# Patient Record
Sex: FEMALE | Race: WHITE | NOT HISPANIC OR LATINO | URBAN - METROPOLITAN AREA
[De-identification: names, ages, dates, MRNs, and addresses within clinical notes are randomized per-mention and may not be internally consistent; named-entity substitution may affect disease eponyms.]

---

## 2023-11-08 DIAGNOSIS — N89.8 VAGINAL DRYNESS: Primary | ICD-10-CM

## 2023-11-08 RX ORDER — ESTRADIOL 0.1 MG/G
1 CREAM VAGINAL
COMMUNITY
End: 2023-11-08 | Stop reason: SDUPTHER

## 2023-11-08 RX ORDER — ESTRADIOL 0.1 MG/G
1 CREAM VAGINAL
Qty: 127.5 G | Refills: 2 | Status: SHIPPED | OUTPATIENT
Start: 2023-11-09

## 2023-11-08 NOTE — TELEPHONE ENCOUNTER
Patient called requesting refill for Estradiol vaginal cream.  Request for refill sent to Diana Gomez CNP.

## 2024-05-31 NOTE — PROGRESS NOTES
"Patient ID: Angie Morin is a 63 y.o. female.  Primary Care Provider: Brandon Good, DO    Subjective    56 yo who underwent hysterectomy and ultimately trachelectomy with bilateral salpingo-ophorectomy 2015 for what was thought to be a routine fibroid surgery and pathology diagnosed a high grade endometrial cancer.  Pathology review favors endometrioid histology.  Anatomy at surgery was challenging.  Uterus would not mobilize and myometrium split in the effort to mobilize.  Supracervical hysterectomy followed by trachelectomy was performed. 515 gram supracervical specimen mostly due to fibroid change.  There was an intracavitary adenocarcinoma deemed high grade endometrial carcinoma with >50 % myoinvasive with no LVSI.  Per op note, there was also tumor contamination of the operative field.  No obvious peritoneal studding seen.     Patient reports undergoing D&C for AUB (heavy, regular bleeding) in ; pathology c/w fibroids and patient subsequently underwent ablation.  She was doing well until 2 years ago when she noted worsening \"pressure\".  In July, she had an episode of bleeding with associated RLQ pain.  She underwent pelvic US which revealed enlarged, presumed fibroid uterus and was recommended ERICA/BSO which she underwent on  as above.     Referred by Dr. Juve Call.  Personal friend of Dr. Vicky Schneider.  Had chemotherapy locally by Dr. Schneider.   Last chemotherapy April 15.  Tolerated T/C x 6 x 6 cycles.    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[place], former teacher; lives with  Marvin; denies ETOH, tobacco, illicits  FH: mother  of liver cancer at 68; paternal " and maternal grandmothers breast cancer in late 70s      Objective    Visit Vitals  /77 (BP Location: Right arm, Patient Position: Sitting, BP Cuff Size: Adult)   Pulse 64   Temp 36.2 °C (97.2 °F) (Temporal)   Resp 16        Interval history:  Patient is a 63 year old female with a history of Stage IB grade 3 endometrial cancer s/p hysterectomy and ultimately trachelectomy with bilateral salpingo-oophorectomy on November 18, 2015 and s/p 6 cycles of Carbo/Taxol and EBRT after chemotherapy in [facility] by Dr. Vicky Schneider and Dr. Talley. Vaginal brachytherapy x 3 at [facility] (Dr. Maldonado). Completed June 27, 2016.  Had difficulty with stool incontinence and radiation vaginitis, did hyperbaric treatments and pelvic floor therapy and been using Estrace cream.  Patient states her stool incontinence has worsened, has previously seen GI and pelvic floor therapy.  Recently saw PCP and was started on Colesevelam about a week ago, she has already noticed an improvement in symptoms.  Still eating increased fiber and takes Imodium as needed.  Patient still using Estrace as prescribed, denies any dyspareunia, only spotting a few times after sex.  Denies any bladder issues.  Mammogram is up to date.       Physical Exam:    Constitutional: Doing well. MONICA  Eyes: PERRL  ENMT: Moist mucus membranes  Head/Neck: Supple. Symmetrical  Cardiovascular: Regular, rate and rhythm. 2+ equal pulses of the extremities  Respiratory/Thorax: CTA. RRR. Chest rise symmetrical.  Gastrointestinal: Non-distended, soft, non-tender  Genitourinary:   Normal external female genitalia. No vulvar lesions noted  Speculum exam: Smooth vaginal walls without lesions or masses. Vaginal cuff visualized without lesions. Shortened vaginal, agglutination on right, improved radiation changes from previous exam  Bimanual exam: Smooth vaginal wall without lesions or masses.  Surgically absent uterus, cervix, and adnexa.    Rectovaginal exam: smooth rectovaginal septum  without lesions or masses  Musculoskeletal: ROM intact, no joint swelling, normal strength  Breast:  No nipple discharge, no asymmetry, no masses noted  Extremities: No edema  Neurological: Alert and oriented x 3. Pleasant and cooperative.  Lymphatic: No lymphadenopathy. No lymphedema  Psychological: Appropriate mood and behavior  Skin: Warm and dry, no lesions, no rashes    A complete review of systems was performed and all systems were normal except what is noted in the interval history.          Assessment/Plan   Patient is a 63 year old female with a history of Stage IB grade 3 endometrial cancer s/p hysterectomy and ultimately trachelectomy with bilateral salpingo-oophorectomy on November 18, 2015 and s/p 6 cycles of Carbo/Taxol and EBRT after chemotherapy in [facility] by Dr. Vicky Schneider and Dr. Talley. Vaginal brachytherapy x 3 at [facility] (Dr. Maldonado). Completed June 27, 2016. MONICA 8 years.       PLAN:  Continue with estrace as prescribed  F/U in 1 year or as needed  Physical examination was within normal limits today.  She is currently MONICA.  We reviewed signs and symptoms of possible recurrence with the patient and she will call our office should she experience any of these.

## 2024-06-04 ENCOUNTER — OFFICE VISIT (OUTPATIENT)
Dept: GYNECOLOGIC ONCOLOGY | Facility: CLINIC | Age: 64
End: 2024-06-04
Payer: COMMERCIAL

## 2024-06-04 VITALS
DIASTOLIC BLOOD PRESSURE: 77 MMHG | SYSTOLIC BLOOD PRESSURE: 126 MMHG | BODY MASS INDEX: 28.31 KG/M2 | HEART RATE: 64 BPM | WEIGHT: 181.66 LBS | RESPIRATION RATE: 16 BRPM | OXYGEN SATURATION: 98 % | TEMPERATURE: 97.2 F

## 2024-06-04 DIAGNOSIS — C54.1 ENDOMETRIAL CANCER (MULTI): Primary | ICD-10-CM

## 2024-06-04 PROCEDURE — 99213 OFFICE O/P EST LOW 20 MIN: CPT | Performed by: NURSE PRACTITIONER

## 2024-06-04 RX ORDER — COLESEVELAM 180 1/1
TABLET ORAL
COMMUNITY
Start: 2024-05-29

## 2024-06-04 RX ORDER — OMEPRAZOLE 20 MG/1
20 CAPSULE, DELAYED RELEASE ORAL
COMMUNITY

## 2024-06-04 RX ORDER — LIDOCAINE 50 MG/G
PATCH TOPICAL
COMMUNITY
Start: 2024-01-26

## 2024-06-04 RX ORDER — TRIAMCINOLONE ACETONIDE 1 MG/ML
LOTION TOPICAL
COMMUNITY
Start: 2024-05-15

## 2024-06-04 RX ORDER — CLOTRIMAZOLE AND BETAMETHASONE DIPROPIONATE 10; .64 MG/G; MG/G
CREAM TOPICAL
COMMUNITY
Start: 2024-05-28

## 2024-06-04 RX ORDER — PNV NO.95/FERROUS FUM/FOLIC AC 28MG-0.8MG
100 TABLET ORAL DAILY
COMMUNITY

## 2024-06-04 RX ORDER — GABAPENTIN 100 MG/1
CAPSULE ORAL
COMMUNITY
Start: 2023-07-20

## 2024-06-04 RX ORDER — MELOXICAM 15 MG/1
15 TABLET ORAL DAILY
COMMUNITY
Start: 2024-04-29

## 2024-06-04 RX ORDER — LANOLIN ALCOHOL/MO/W.PET/CERES
1000 CREAM (GRAM) TOPICAL DAILY
COMMUNITY
Start: 2024-03-11

## 2024-06-04 RX ORDER — LYSINE HCL 500 MG
TABLET ORAL
COMMUNITY
Start: 2017-02-03

## 2024-06-04 ASSESSMENT — PAIN SCALES - GENERAL: PAINLEVEL: 0-NO PAIN

## 2024-12-06 DIAGNOSIS — N89.8 VAGINAL DRYNESS: ICD-10-CM

## 2024-12-06 RX ORDER — ESTRADIOL 0.1 MG/G
1 CREAM VAGINAL
Qty: 127.5 G | Refills: 2 | Status: SHIPPED | OUTPATIENT
Start: 2024-12-07

## 2024-12-06 NOTE — TELEPHONE ENCOUNTER
Patient called requesting refill of Estradiol cream.  Refill pended to Aurora Malhotra, CNP    [id number]  Phoned patient to notify that Estradiol refill was sent to [facility].

## 2025-06-03 ENCOUNTER — APPOINTMENT (OUTPATIENT)
Dept: GYNECOLOGIC ONCOLOGY | Facility: CLINIC | Age: 65
End: 2025-06-03
Payer: COMMERCIAL

## 2025-06-03 NOTE — PROGRESS NOTES
"Patient ID: Angie Morin is a 64 y.o. female.  Primary Care Provider: Brandon Good,     Subjective    Underwent hysterectomy and ultimately trachelectomy with bilateral salpingo-ophorectomy 2015 for what was thought to be a routine fibroid surgery and pathology diagnosed a high grade endometrial cancer.  Pathology review favors endometrioid histology.  Anatomy at surgery was challenging.  Uterus would not mobilize and myometrium split in the effort to mobilize.  Supracervical hysterectomy followed by trachelectomy was performed. 515 gram supracervical specimen mostly due to fibroid change.  There was an intracavitary adenocarcinoma deemed high grade endometrial carcinoma with >50 % myoinvasive with no LVSI.  Per op note, there was also tumor contamination of the operative field.  No obvious peritoneal studding seen.     Patient reports undergoing D&C for AUB (heavy, regular bleeding) in ; pathology c/w fibroids and patient subsequently underwent ablation.  She was doing well until 2 years ago when she noted worsening \"pressure\".  In July, she had an episode of bleeding with associated RLQ pain.  She underwent pelvic US which revealed enlarged, presumed fibroid uterus and was recommended ERICA/BSO which she underwent on  as above.     Referred by Dr. Juve Call.  Personal friend of Dr. Vicky Schneider.  Had chemotherapy locally by Dr. Schneider.   Last chemotherapy April 15.  Tolerated T/C x 6 x 6 cycles.    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 Vicky Schneider and Dr. Talley. Vaginal brachytherapy x 3 at [facility] (Dr. Maldonado). Completed June 27, 2016.  Post treatment had difficulty with stool incontinence and radiation vaginitis, did hyperbaric treatments and pelvic floor therapy and been using Estrace cream. PCP started on Colesevelam.  Stool incontinence much improved with colesevelam.  She had one episode of abdominal pain, nausea, vomiting she went to ER, had CT scan at [facility] which impression was diverticulitis.  She has seen GI and they are calling radiation enteritis and adhesive disease.    They were unable to do colonoscopy due to stricture, has been referred to [facility].   Patient is currently sexually active, using Estrace and lubrication, has some pink tinged discharge post coital.  Mammogram is up to date.  Has baseline urinary leakage.           Physical Exam:    Constitutional: Doing well. MONICA  Eyes: PERRL  ENMT: Moist mucus membranes  Head/Neck: Supple. Symmetrical  Cardiovascular: Regular, rate and rhythm. 2+ equal pulses of the extremities  Respiratory/Thorax: CTA. RRR. Chest rise symmetrical.  Gastrointestinal: Non-distended, soft, non-tender  Genitourinary:   Normal external female genitalia. No vulvar lesions noted  Speculum exam: Smooth vaginal walls without lesions or masses. Vaginal cuff visualized without lesions. Shortened vaginal vault, agglutination on right, radiation changes on right.    Bimanual exam: Smooth vaginal wall without lesions or masses.  Surgically absent uterus, cervix, and  adnexa.    Rectovaginal exam: smooth rectovaginal septum without lesions or masses  Musculoskeletal: ROM intact, no joint swelling, normal strength  Breast:  No nipple discharge, no asymmetry, no masses noted  Extremities: No edema  Neurological: Alert and oriented x 3. Pleasant and cooperative.  Lymphatic: No lymphadenopathy. No lymphedema  Psychological: Appropriate mood and behavior  Skin: Warm and dry, no lesions, no rashes    A complete review of systems was performed and all systems were normal except what is noted in the interval history.          Assessment/Plan   Patient is a 64 year old female with a history of Stage IB grade 3 endometrial cancer s/p hysterectomy and ultimately trachelectomy with bilateral salpingo-oophorectomy on November 18, 2015 and s/p 6 cycles of Carbo/Taxol and EBRT after chemotherapy in [facility] by Dr. Vikcy Schneider and Dr. Talley. Vaginal brachytherapy x 3 at [facility] (Dr. Maldonado). Completed June 27, 2016. MONICA 9 years.  Radiation enteritis, adhesive disease.     PLAN:  Refill Estrace  Will follow with GI, call us if surgical management needed  F/U in 1 year or as needed  Mammogram order  Physical examination was within normal limits today.  She is currently MONICA.  We reviewed signs and symptoms of possible recurrence with the patient and she will call our office should she experience any of these.

## 2025-06-04 ENCOUNTER — OFFICE VISIT (OUTPATIENT)
Dept: GYNECOLOGIC ONCOLOGY | Facility: CLINIC | Age: 65
End: 2025-06-04
Payer: COMMERCIAL

## 2025-06-04 VITALS
TEMPERATURE: 98.1 F | HEART RATE: 68 BPM | BODY MASS INDEX: 26.9 KG/M2 | OXYGEN SATURATION: 97 % | SYSTOLIC BLOOD PRESSURE: 116 MMHG | RESPIRATION RATE: 16 BRPM | WEIGHT: 172.62 LBS | DIASTOLIC BLOOD PRESSURE: 77 MMHG

## 2025-06-04 DIAGNOSIS — Z12.31 SCREENING MAMMOGRAM, ENCOUNTER FOR: Primary | ICD-10-CM

## 2025-06-04 DIAGNOSIS — N89.8 VAGINAL DRYNESS: ICD-10-CM

## 2025-06-04 PROCEDURE — 99214 OFFICE O/P EST MOD 30 MIN: CPT | Performed by: NURSE PRACTITIONER

## 2025-06-04 PROCEDURE — 1036F TOBACCO NON-USER: CPT | Performed by: NURSE PRACTITIONER

## 2025-06-04 RX ORDER — ESTRADIOL 0.1 MG/G
1 CREAM VAGINAL
Qty: 127.5 G | Refills: 2 | Status: SHIPPED | OUTPATIENT
Start: 2025-06-05

## 2025-06-04 ASSESSMENT — PAIN SCALES - GENERAL: PAINLEVEL_OUTOF10: 0-NO PAIN

## 2025-06-10 ENCOUNTER — APPOINTMENT (OUTPATIENT)
Dept: GYNECOLOGIC ONCOLOGY | Facility: CLINIC | Age: 65
End: 2025-06-10
Payer: COMMERCIAL

## 2026-06-10 ENCOUNTER — APPOINTMENT (OUTPATIENT)
Dept: GYNECOLOGIC ONCOLOGY | Facility: CLINIC | Age: 66
End: 2026-06-10
Payer: COMMERCIAL